# Patient Record
Sex: FEMALE | Race: AMERICAN INDIAN OR ALASKA NATIVE
[De-identification: names, ages, dates, MRNs, and addresses within clinical notes are randomized per-mention and may not be internally consistent; named-entity substitution may affect disease eponyms.]

---

## 2017-07-14 ENCOUNTER — HOSPITAL ENCOUNTER (EMERGENCY)
Dept: HOSPITAL 31 - C.ER | Age: 22
Discharge: HOME | End: 2017-07-14
Payer: COMMERCIAL

## 2017-07-14 VITALS
SYSTOLIC BLOOD PRESSURE: 116 MMHG | TEMPERATURE: 98 F | HEART RATE: 76 BPM | DIASTOLIC BLOOD PRESSURE: 77 MMHG | OXYGEN SATURATION: 100 % | RESPIRATION RATE: 17 BRPM

## 2017-07-14 VITALS — BODY MASS INDEX: 22.3 KG/M2

## 2017-07-14 DIAGNOSIS — R20.9: ICD-10-CM

## 2017-07-14 DIAGNOSIS — G89.29: Primary | ICD-10-CM

## 2017-07-14 DIAGNOSIS — M54.2: ICD-10-CM

## 2017-07-14 NOTE — C.PDOC
History Of Present Illness


23 Y/O FEMALE C/O INCREASED PAIN TO NECK RADIATING TO BILATERAL ARMS. PT ALSO 

REPORTS LEFT POSTERIOR LEG PAIN. NOTES HISTORY OF BULGING DISCS x MARCH 2017, 

TAKES IBUPROFEN AND MUSCLE RELAXANT DAILY. DENIES NEW INJURY OR TRAUMA. 


Time Seen by Provider: 07/14/17 07:38


Chief Complaint (Nursing): Upper Extremity Problem/Injury


History Per: Patient


History/Exam Limitations: no limitations


Onset/Duration Of Symptoms: Days


Current Symptoms Are (Timing): Still Present


Reports Recently: Treated By A Physician


Recent travel outside of the [place]: No





Past Medical History


Reviewed: Historical Data, Nursing Documentation, Vital Signs


Vital Signs: 


 Last Vital Signs











Temp  98.2 F   07/14/17 07:27


 


Pulse  95 H  07/14/17 07:27


 


Resp  18   07/14/17 07:27


 


BP  120/72   07/14/17 07:27


 


Pulse Ox  98   07/14/17 08:14














- Medical History


PMH: Asthma, Bronchitis





- [facility] Procedures








INJECT/INFUSE NEC (02/23/15)








Family History: States: Unknown Family Hx





- Social History


Hx Tobacco Use: No


Hx Alcohol Use: Yes


Hx Substance Use: No





- Immunization History


Hx Tetanus Toxoid Vaccination: No


Hx Influenza Vaccination: No


Hx Pneumococcal Vaccination: No





Review Of Systems


Except As Marked, All Systems Reviewed And Found Negative.


Constitutional: Negative for: Fever, Chills


Cardiovascular: Negative for: Chest Pain


Respiratory: Negative for: Cough


Gastrointestinal: Negative for: Nausea, Vomiting


Musculoskeletal: Positive for: Neck Pain, Arm Pain, Leg Pain


Skin: Negative for: Rash


Neurological: Negative for: Headache, Dizziness





Physical Exam





- Physical Exam


Appears: Non-toxic, No Acute Distress


Skin: Normal Color, Warm, Dry


Head: Atraumatic, Normacephalic


Neck: Normal ROM, No Midline Cervical Tenderness, No Step Off Deformity, Supple


Chest: Symmetrical


Cardiovascular: Rhythm Regular


Respiratory: Normal Breath Sounds, No Rales, No Rhonchi, No Wheezing


Gastrointestinal/Abdominal: Soft, No Tenderness, No Guarding, No Rebound


Back: Normal Inspection, No Vertebral Tenderness, No Paraspinal Tenderness


Extremity: Normal ROM, No Tenderness, Capillary Refill (< 2 SEC. ), No Deformity

, No Swelling


Extremity: Bilateral: Normal Color And Temperature


Neurological/Psych: Oriented x3, Normal Speech, Normal Cognition, Normal Motor, 

Normal Sensation





ED Course And Treatment


O2 Sat by Pulse Oximetry: 98 (RA)


Pulse Ox Interpretation: Normal





Disposition


Counseled Patient/Family Regarding: Diagnosis, Need For Followup, Rx Given





- Disposition


Referrals: 


YOUR,NEUROLOGIST [Other]


Disposition: HOME/ ROUTINE


Disposition Time: 08:11


Condition: IMPROVED


Prescriptions: 


Dexamethasone 12 mg PO ONCE #2 tab


oxyCODONE/Acetaminophen [Percocet 5/325 mg Tab] 1 ea PO QID #8 tab


Instructions:  Cervical Radiculopathy (ED)


Forms:  Work Excuse





- Clinical Impression


Clinical Impression: 


 Chronic radicular cervical pain, Paresthesia








- Scribe Statement


The provider has reviewed the documentation as recorded by the Hussain SKAGGS


Provider Attestation: 








All medical record entries made by the Hussain were at my direction and 

personally dictated by me. I have reviewed the chart and agree that the record 

accurately reflects my personal performance of the history, physical exam, 

medical decision making, and the department course for this patient. I have 

also personally directed, reviewed, and agree with the discharge instructions 

and disposition.

## 2017-08-05 ENCOUNTER — HOSPITAL ENCOUNTER (EMERGENCY)
Dept: HOSPITAL 31 - C.ER | Age: 22
LOS: 1 days | Discharge: HOME | End: 2017-08-06
Payer: COMMERCIAL

## 2017-08-05 VITALS — BODY MASS INDEX: 22.3 KG/M2

## 2017-08-05 DIAGNOSIS — K59.00: Primary | ICD-10-CM

## 2017-08-06 VITALS
SYSTOLIC BLOOD PRESSURE: 109 MMHG | HEART RATE: 66 BPM | RESPIRATION RATE: 16 BRPM | TEMPERATURE: 98.4 F | DIASTOLIC BLOOD PRESSURE: 71 MMHG

## 2017-08-06 VITALS — OXYGEN SATURATION: 99 %

## 2017-08-06 LAB
BILIRUB UR-MCNC: NEGATIVE MG/DL
GLUCOSE UR STRIP-MCNC: NORMAL MG/DL
HCG,QUALITATIVE URINE: NEGATIVE
LEUKOCYTE ESTERASE UR-ACNC: (no result) LEU/UL
PH UR STRIP: 5 [PH] (ref 5–8)
PROT UR STRIP-MCNC: NEGATIVE MG/DL
RBC # UR STRIP: (no result) /UL
SP GR UR STRIP: 1.02 (ref 1–1.03)
SQUAMOUS EPITHIAL: 11 /HPF (ref 0–5)
URINE NITRATE: NEGATIVE
UROBILINOGEN UR-MCNC: NORMAL MG/DL (ref 0.2–1)

## 2017-08-06 NOTE — RAD
Abdomen four views 



History: Obstructive series. 



Comparison: None available. 



Findings: 



Lung fields are clear. 



Bibasilar breast and nipple shadows.  Nipple rings in place. 



Relative paucity of small bowel gas. 



Moderate fecal retention in the visualized colon. 



Possible vaginal ring in place. Clinical correlation. 



Impression: 



Moderate fecal retention in the colon.

## 2017-08-06 NOTE — C.PDOC
History Of Present Illness


22 year old female presents to the ED with complaints of LLQ pain and urinary 

frequency for three days. Patient notes history of intermittent constipation 

and has had one bowel movement in the last week. She denies any nausea, vomiting

, diarrhea, fever, vaginal discharge, or dysuria. 


Time Seen by Provider: 08/06/17 00:21


Chief Complaint (Nursing): Abdominal Pain


History Per: Patient


History/Exam Limitations: no limitations


Onset/Duration Of Symptoms: Days (3 days )


Current Symptoms Are (Timing): Still Present


Location Of Pain/Discomfort: LLQ


Radiation Of Pain To:: None


Quality Of Discomfort: "Pain"


Associated Symptoms: Constipation, Other (urinary frequency ).  denies: Fever, 

Chills, Nausea, Vomiting, Diarrhea


Last Bowel Movement: Days Ago


Recent travel outside of the [place]: No


Abnormal Vaginal Bleeding: No





Past Medical History


Reviewed: Historical Data, Nursing Documentation, Vital Signs


Vital Signs: 


 Last Vital Signs











Temp  98.4 F   08/06/17 01:42


 


Pulse  66   08/06/17 01:42


 


Resp  16   08/06/17 01:42


 


BP  109/71   08/06/17 01:42


 


Pulse Ox  99   08/06/17 01:55














- Medical History


PMH: Asthma, Bronchitis





- [facility] Procedures








INJECT/INFUSE NEC (02/23/15)








Family History: States: Unknown Family Hx





- Social History


Hx Tobacco Use: No


Hx Alcohol Use: Yes


Hx Substance Use: No





- Immunization History


Hx Tetanus Toxoid Vaccination: No


Hx Influenza Vaccination: No


Hx Pneumococcal Vaccination: No





Review Of Systems


Constitutional: Negative for: Fever, Chills


Cardiovascular: Negative for: Chest Pain


Respiratory: Negative for: Shortness of Breath


Gastrointestinal: Positive for: Abdominal Pain, Constipation.  Negative for: 

Nausea, Vomiting, Diarrhea


Genitourinary: Positive for: Frequency





Physical Exam





- Physical Exam


Appears: Non-toxic, No Acute Distress


Skin: Warm, Dry


Head: Atraumatic


Eye(s): bilateral: Normal Inspection, PERRL, EOMI


Oral Mucosa: Moist


Neck: Supple


Chest: Symmetrical, No Deformity


Cardiovascular: Rhythm Regular


Respiratory: Normal Breath Sounds, No Wheezing


Gastrointestinal/Abdominal: Bowel Sounds (nl), Soft, No Tenderness, Distention (

slightly distended to bilateral lower quadrants ), No Guarding, No Rebound


Back: No CVA Tenderness


Pelvic: No Vaginal Discharge, No Cervical Motion Tenderness, No Adnexal 

Tenderness


Neurological/Psych: Oriented x3





ED Course And Treatment


O2 Sat by Pulse Oximetry: 99 (room air)


Pulse Ox Interpretation: Normal





- Other Rad


  ** Abd XR


X-Ray: Interpreted by Me, Viewed By Me


Interpretation: fecal impaction


Progress Note: UA and UcG reviewed and were negative. Abdominal X-Ray shows 

fecal impaction. Patient was given naproxen and enulose. Patient tolerated PO 

and resting comfortably.


Reassessment Condition: Improved





Disposition


Counseled Patient/Family Regarding: Diagnosis, Need For Followup, Rx Given





- Disposition


Disposition: HOME/ ROUTINE


Disposition Time: 01:07


Condition: STABLE


Additional Instructions: 


Please follow up with PMD 





High fiber diet





Take miralax as directed





Return to ER if worse 


Prescriptions: 


Polyethylene Glycol 3350 [Miralax] 17 gm PO DAILY #1 bottle


Instructions:  Constipation (ED), High Fiber Diet (ED)


Forms:  [facility] (English)





- Clinical Impression


Clinical Impression: 


 Constipation








- Scribe Statement


The provider has reviewed the documentation as recorded by the Scribe





Funmi Rowan





All medical record entries made by the Scribe were at my direction and 

personally dictated by me. I have reviewed the chart and agree that the record 

accurately reflects my personal performance of the history, physical exam, 

medical decision making, and the department course for this patient. I have 

also personally directed, reviewed, and agree with the discharge instructions 

and disposition.

## 2017-09-26 ENCOUNTER — HOSPITAL ENCOUNTER (EMERGENCY)
Dept: HOSPITAL 31 - C.ER | Age: 22
Discharge: HOME | End: 2017-09-26
Payer: COMMERCIAL

## 2017-09-26 VITALS
TEMPERATURE: 98.8 F | HEART RATE: 72 BPM | RESPIRATION RATE: 16 BRPM | DIASTOLIC BLOOD PRESSURE: 74 MMHG | SYSTOLIC BLOOD PRESSURE: 109 MMHG

## 2017-09-26 VITALS — BODY MASS INDEX: 22.3 KG/M2

## 2017-09-26 VITALS — OXYGEN SATURATION: 98 %

## 2017-09-26 DIAGNOSIS — N39.0: Primary | ICD-10-CM

## 2017-09-26 LAB
ALBUMIN/GLOB SERPL: 1.6 {RATIO} (ref 1–2.1)
ALP SERPL-CCNC: 60 U/L (ref 38–126)
ALT SERPL-CCNC: 25 U/L (ref 9–52)
AST SERPL-CCNC: 23 U/L (ref 14–36)
BACTERIA #/AREA URNS HPF: (no result) /[HPF]
BASOPHILS # BLD AUTO: 0 K/UL (ref 0–0.2)
BASOPHILS NFR BLD: 0.9 % (ref 0–2)
BILIRUB SERPL-MCNC: 0.8 MG/DL (ref 0.2–1.3)
BILIRUB UR-MCNC: NEGATIVE MG/DL
BUN SERPL-MCNC: 8 MG/DL (ref 7–17)
CALCIUM SERPL-MCNC: 9.2 MG/DL (ref 8.6–10.4)
CHLORIDE SERPL-SCNC: 102 MMOL/L (ref 98–107)
CO2 SERPL-SCNC: 23 MMOL/L (ref 22–30)
EOSINOPHIL # BLD AUTO: 0.1 K/UL (ref 0–0.7)
EOSINOPHIL NFR BLD: 1.7 % (ref 0–4)
ERYTHROCYTE [DISTWIDTH] IN BLOOD BY AUTOMATED COUNT: 12.9 % (ref 11.5–14.5)
GLOBULIN SER-MCNC: 2.6 GM/DL (ref 2.2–3.9)
GLUCOSE SERPL-MCNC: 70 MG/DL (ref 65–105)
GLUCOSE UR STRIP-MCNC: NORMAL MG/DL
HCT VFR BLD CALC: 37.8 % (ref 34–47)
KETONES UR STRIP-MCNC: NEGATIVE MG/DL
LEUKOCYTE ESTERASE UR-ACNC: (no result) LEU/UL
LYMPHOCYTES # BLD AUTO: 1.8 K/UL (ref 1–4.3)
LYMPHOCYTES NFR BLD AUTO: 33.1 % (ref 20–40)
MCH RBC QN AUTO: 29.6 PG (ref 27–31)
MCHC RBC AUTO-ENTMCNC: 33.6 G/DL (ref 33–37)
MCV RBC AUTO: 88.1 FL (ref 81–99)
MONOCYTES # BLD: 0.5 K/UL (ref 0–0.8)
MONOCYTES NFR BLD: 10 % (ref 0–10)
NRBC BLD AUTO-RTO: 0 % (ref 0–2)
PH UR STRIP: 5 [PH] (ref 5–8)
PLATELET # BLD: 265 K/UL (ref 130–400)
PMV BLD AUTO: 7.9 FL (ref 7.2–11.7)
POTASSIUM SERPL-SCNC: 3.8 MMOL/L (ref 3.6–5.2)
PROT SERPL-MCNC: 6.7 G/DL (ref 6.3–8.3)
PROT UR STRIP-MCNC: NEGATIVE MG/DL
RBC # UR STRIP: (no result) /UL
RBC #/AREA URNS HPF: 3 /HPF (ref 0–3)
SODIUM SERPL-SCNC: 139 MMOL/L (ref 132–148)
SP GR UR STRIP: 1.02 (ref 1–1.03)
UROBILINOGEN UR-MCNC: NORMAL MG/DL (ref 0.2–1)
WBC # BLD AUTO: 5.3 K/UL (ref 4.8–10.8)
WBC #/AREA URNS HPF: 8 /HPF (ref 0–5)

## 2017-09-26 PROCEDURE — 81001 URINALYSIS AUTO W/SCOPE: CPT

## 2017-09-26 PROCEDURE — 87086 URINE CULTURE/COLONY COUNT: CPT

## 2017-09-26 PROCEDURE — 96361 HYDRATE IV INFUSION ADD-ON: CPT

## 2017-09-26 PROCEDURE — 96374 THER/PROPH/DIAG INJ IV PUSH: CPT

## 2017-09-26 PROCEDURE — 87181 SC STD AGAR DILUTION PER AGT: CPT

## 2017-09-26 PROCEDURE — 99285 EMERGENCY DEPT VISIT HI MDM: CPT

## 2017-09-26 PROCEDURE — 85025 COMPLETE CBC W/AUTO DIFF WBC: CPT

## 2017-09-26 PROCEDURE — 80053 COMPREHEN METABOLIC PANEL: CPT

## 2017-09-26 PROCEDURE — 84703 CHORIONIC GONADOTROPIN ASSAY: CPT

## 2017-09-26 NOTE — C.PDOC
History Of Present Illness


22 year old female presents to the ED for evaluation of a headache which began 

around 2 weeks ago. Patient also reports nausea, generalized weakness, strong 

odorous urine, sharp back pain, and notes she had two episodes of vomiting 

during the past 2 weeks. Patient also reports increased urinary frequency and 

notes she is always thirsty. Patient has not taken any medicine for her 

symptoms. Patient reports a history of frequent UTIs in the past. She denies 

fever, chills, diarrhea, constipation, past surgical history. 


Time Seen by Provider: 09/26/17 13:52


Chief Complaint (Nursing): Headache


History Per: Patient


History/Exam Limitations: no limitations


Onset/Duration Of Symptoms: Days (2 weeks)


Current Symptoms Are (Timing): Still Present


Additional History Per: Patient





Past Medical History


Reviewed: Historical Data, Nursing Documentation, Vital Signs


Vital Signs: 


 Last Vital Signs











Temp  98.8 F   09/26/17 15:27


 


Pulse  72   09/26/17 15:27


 


Resp  16   09/26/17 15:27


 


BP  109/74   09/26/17 15:27


 


Pulse Ox  98   09/26/17 20:27














- Medical History


PMH: Asthma, Bronchitis


Surgical History: No Surg Hx





- [facility] Procedures








INJECT/INFUSE NEC (02/23/15)








Family History: States: Unknown Family Hx





- Social History


Hx Tobacco Use: No


Hx Alcohol Use: Yes


Hx Substance Use: No





- Immunization History


Hx Tetanus Toxoid Vaccination: No


Hx Influenza Vaccination: No


Hx Pneumococcal Vaccination: No





Review Of Systems


Constitutional: Positive for: Weakness (generalized ).  Negative for: Fever, 

Chills


Gastrointestinal: Positive for: Nausea, Vomiting.  Negative for: Diarrhea, 

Constipation


Genitourinary: Positive for: Frequency, Other (odorous urine )


Musculoskeletal: Positive for: Back Pain


Neurological: Positive for: Headache





Physical Exam





- Physical Exam


Appears: Non-toxic, No Acute Distress


Skin: Normal Color, Warm, Dry


Head: Atraumatic, Normacephalic


Eye(s): bilateral: Normal Inspection


Oral Mucosa: Moist


Neck: Supple


Chest: Symmetrical, No Deformity, No Tenderness


Cardiovascular: Rhythm Regular, No Murmur


Respiratory: Normal Breath Sounds, No Rales, No Rhonchi, No Wheezing


Gastrointestinal/Abdominal: Soft, No Tenderness, No Guarding, No Rebound


Back: Normal Inspection, No CVA Tenderness


Extremity: Normal ROM, Capillary Refill (less than 2 seconds )


Neurological/Psych: Oriented x3, Normal Speech


Gait: Steady





ED Course And Treatment





- Laboratory Results


Result Diagrams: 


 09/26/17 14:19





 09/26/17 14:19


O2 Sat by Pulse Oximetry: 98 (on RA)


Pulse Ox Interpretation: Normal





Medical Decision Making


Medical Decision Making: 





Plan: 


* labs 


* Zofran IV


* IV Fluids 


* reassess and disposition


Progress: 


labs ordered and reviewed. Patient has UTI. Upon re-eval patient has no fever 

and reports feeling better. Patient stable for discharge and will send home 

with Rx. Advise follow up with PCP





Disposition


Counseled Patient/Family Regarding: Diagnosis, Need For Followup, Rx Given





- Disposition


[facility]errals[facility] 


[facility]


Disposition: HOME/ ROUTINE


Disposition Time: 15:15


Condition: IMPROVED


Additional Instructions: 


Take antibiotic twice daily and be sure to finish taking all of antibiotic. 

Drink plenty of fluids. If urine culture was performed, call back for results 

in 2-3 days for results to confirm antibiotic is treating UTI well. 


Prescriptions: 


Ciprofloxacin [Cipro] 1 tab PO BID #10 tab


Instructions:  Urinary Tract Infection in Women (ED)


Forms:  [facility] (English)





- POA


Present On Arrival: None





- Clinical Impression


Clinical Impression: 


 UTI (urinary tract infection)








- PA / NP / Resident Statement


MD/DO has reviewed & agrees with the documentation as recorded.





- Scribe Statement


The provider has reviewed the documentation as recorded by the Scribe (July Hodges)








All medical record entries made by the Scribe were at my direction and 

personally dictated by me. I have reviewed the chart and agree that the record 

accurately reflects my personal performance of the history, physical exam, 

medical decision making, and the department course for this patient. I have 

also personally directed, reviewed, and agree with the discharge instructions 

and disposition.

## 2017-11-24 ENCOUNTER — HOSPITAL ENCOUNTER (EMERGENCY)
Dept: HOSPITAL 31 - C.ER | Age: 22
Discharge: LEFT BEFORE DISCHARGE | End: 2017-11-24
Payer: COMMERCIAL

## 2017-11-24 VITALS
SYSTOLIC BLOOD PRESSURE: 108 MMHG | HEART RATE: 84 BPM | OXYGEN SATURATION: 100 % | DIASTOLIC BLOOD PRESSURE: 69 MMHG | TEMPERATURE: 98.3 F | RESPIRATION RATE: 18 BRPM

## 2017-11-24 VITALS — BODY MASS INDEX: 21.6 KG/M2

## 2017-11-24 DIAGNOSIS — R10.30: Primary | ICD-10-CM

## 2017-11-24 DIAGNOSIS — Z02.9: ICD-10-CM

## 2017-11-24 LAB
BACTERIA #/AREA URNS HPF: (no result) /[HPF]
BILIRUB UR-MCNC: NEGATIVE MG/DL
GLUCOSE UR STRIP-MCNC: NORMAL MG/DL
KETONES UR STRIP-MCNC: (no result) MG/DL
LEUKOCYTE ESTERASE UR-ACNC: (no result) LEU/UL
PH UR STRIP: 6 [PH] (ref 5–8)
PROT UR STRIP-MCNC: NEGATIVE MG/DL
RBC # UR STRIP: NEGATIVE /UL
RBC #/AREA URNS HPF: 2 /HPF (ref 0–3)
SP GR UR STRIP: 1.02 (ref 1–1.03)
UROBILINOGEN UR-MCNC: NORMAL MG/DL (ref 0.2–1)
WBC #/AREA URNS HPF: 1 /HPF (ref 0–5)

## 2017-11-24 PROCEDURE — 81001 URINALYSIS AUTO W/SCOPE: CPT

## 2017-11-24 PROCEDURE — 84703 CHORIONIC GONADOTROPIN ASSAY: CPT

## 2017-11-25 ENCOUNTER — HOSPITAL ENCOUNTER (EMERGENCY)
Dept: HOSPITAL 31 - C.ER | Age: 22
Discharge: HOME | End: 2017-11-25
Payer: COMMERCIAL

## 2017-11-25 VITALS — OXYGEN SATURATION: 100 % | TEMPERATURE: 98.3 F | RESPIRATION RATE: 18 BRPM

## 2017-11-25 VITALS — SYSTOLIC BLOOD PRESSURE: 102 MMHG | DIASTOLIC BLOOD PRESSURE: 66 MMHG | HEART RATE: 81 BPM

## 2017-11-25 VITALS — BODY MASS INDEX: 21.6 KG/M2

## 2017-11-25 DIAGNOSIS — Z3A.00: ICD-10-CM

## 2017-11-25 DIAGNOSIS — O26.891: Primary | ICD-10-CM

## 2017-11-25 DIAGNOSIS — R10.2: ICD-10-CM

## 2017-11-25 LAB
ALBUMIN/GLOB SERPL: 1.9 {RATIO} (ref 1–2.1)
ALP SERPL-CCNC: 52 U/L (ref 38–126)
ALT SERPL-CCNC: 38 U/L (ref 9–52)
AST SERPL-CCNC: 24 U/L (ref 14–36)
BASOPHILS # BLD AUTO: 0 K/UL (ref 0–0.2)
BASOPHILS NFR BLD: 0.4 % (ref 0–2)
BILIRUB SERPL-MCNC: 1 MG/DL (ref 0.2–1.3)
BILIRUB UR-MCNC: NEGATIVE MG/DL
BUN SERPL-MCNC: 8 MG/DL (ref 7–17)
CALCIUM SERPL-MCNC: 8.7 MG/DL (ref 8.6–10.4)
CHLORIDE SERPL-SCNC: 101 MMOL/L (ref 98–107)
CO2 SERPL-SCNC: 22 MMOL/L (ref 22–30)
EOSINOPHIL # BLD AUTO: 0.1 K/UL (ref 0–0.7)
EOSINOPHIL NFR BLD: 1.2 % (ref 0–4)
ERYTHROCYTE [DISTWIDTH] IN BLOOD BY AUTOMATED COUNT: 13.4 % (ref 11.5–14.5)
GLOBULIN SER-MCNC: 2.2 GM/DL (ref 2.2–3.9)
GLUCOSE SERPL-MCNC: 86 MG/DL (ref 65–105)
GLUCOSE UR STRIP-MCNC: NORMAL MG/DL
HCT VFR BLD CALC: 38.7 % (ref 34–47)
KETONES UR STRIP-MCNC: (no result) MG/DL
LEUKOCYTE ESTERASE UR-ACNC: (no result) LEU/UL
LYMPHOCYTES # BLD AUTO: 1.6 K/UL (ref 1–4.3)
LYMPHOCYTES NFR BLD AUTO: 23 % (ref 20–40)
MCH RBC QN AUTO: 29.3 PG (ref 27–31)
MCHC RBC AUTO-ENTMCNC: 33.1 G/DL (ref 33–37)
MCV RBC AUTO: 88.3 FL (ref 81–99)
MONOCYTES # BLD: 0.6 K/UL (ref 0–0.8)
MONOCYTES NFR BLD: 8.3 % (ref 0–10)
NRBC BLD AUTO-RTO: 0 % (ref 0–2)
PH UR STRIP: 5 [PH] (ref 5–8)
PLATELET # BLD: 276 K/UL (ref 130–400)
PMV BLD AUTO: 8.2 FL (ref 7.2–11.7)
POTASSIUM SERPL-SCNC: 3.8 MMOL/L (ref 3.6–5.2)
PROT SERPL-MCNC: 6.4 G/DL (ref 6.3–8.3)
PROT UR STRIP-MCNC: NEGATIVE MG/DL
RBC # UR STRIP: NEGATIVE /UL
RBC #/AREA URNS HPF: < 1 /HPF (ref 0–3)
SODIUM SERPL-SCNC: 134 MMOL/L (ref 132–148)
SP GR UR STRIP: 1.02 (ref 1–1.03)
UROBILINOGEN UR-MCNC: NORMAL MG/DL (ref 0.2–1)
WBC # BLD AUTO: 6.9 K/UL (ref 4.8–10.8)
WBC #/AREA URNS HPF: 1 /HPF (ref 0–5)

## 2017-11-25 PROCEDURE — 76805 OB US >/= 14 WKS SNGL FETUS: CPT

## 2017-11-25 PROCEDURE — 96360 HYDRATION IV INFUSION INIT: CPT

## 2017-11-25 PROCEDURE — 87491 CHLMYD TRACH DNA AMP PROBE: CPT

## 2017-11-25 PROCEDURE — 99285 EMERGENCY DEPT VISIT HI MDM: CPT

## 2017-11-25 PROCEDURE — 84702 CHORIONIC GONADOTROPIN TEST: CPT

## 2017-11-25 PROCEDURE — 87591 N.GONORRHOEAE DNA AMP PROB: CPT

## 2017-11-25 PROCEDURE — 81001 URINALYSIS AUTO W/SCOPE: CPT

## 2017-11-25 PROCEDURE — 80053 COMPREHEN METABOLIC PANEL: CPT

## 2017-11-25 PROCEDURE — 76817 TRANSVAGINAL US OBSTETRIC: CPT

## 2017-11-25 PROCEDURE — 84703 CHORIONIC GONADOTROPIN ASSAY: CPT

## 2017-11-25 PROCEDURE — 85025 COMPLETE CBC W/AUTO DIFF WBC: CPT

## 2017-11-25 NOTE — US
PROCEDURE:  Pelvic/OB ultrasound. 



HISTORY:

Pelvic pain in a pregnant patient. 



COMPARISON:

Correlation/ comparison made with prior pelvic ultrasound 07/20/2016.



TECHNIQUE:

Transabdominal/transvaginal sonographic evaluation of the pelvis 

performed.



FINDINGS:

The uterus is anteverted measuring approximately 10.7 x 5.0 x 6.3 cm.



There is small fluid collection within the endometrial canal that 

probably represents gestational sac ; MS D = .88 cm = too small for 

dating. Tiny yolk sac measuring 2.1 mm present. No fetal pole 

-cardiac activity.  Findings most likely represent very early 

intrauterine gestation however followup serial serum beta HCG and 

serial ultrasound recommended to assess for development of viable 

intrauterine gestation and exclude ectopic pregnancy. 



Free fluid is present within the cul de sac. 



Right ovary measures approximately 5.4 x 3.6 x 5.7 cm.  There is a 

small cyst likely representing corpus luteum cyst of pregnancy that 

measures 3.1 x 3.1 x 3.9 cm. Arterial flow noted right ovary. 



Left ovary measures approximately 3.0 x 1.9 x 3.1 cm. Left ovary 

exhibits arterial flow. 



IMPRESSION:

Findings most likely represent very early intrauterine gestation 

however fetal pole and cardiac activity not documented at this time. 

Recommend of follow-up serial serum beta HCG and serial ultrasound to 

assess for development of viable intrauterine gestation and exclude 

ectopic pregnancy. 



There is free fluid seen in the cul de sac. 



Probable corpus luteum cyst of pregnancy right ovary.  Note this 

report was placed in PA review folder followup.

## 2017-11-25 NOTE — C.PDOC
History Of Present Illness





23 y/o female LMP 10/19, c/o pelvic pain for 2 weeks. Patient reports missing 

her period this month and took a home pregnancy test that was positive. Patient 

reports a miscarriage June of this year. Patient also c/o whitish vaginal 

discharge. Denies dysuria, hematuria, rash, or any other complaints.





Time Seen by Provider: 11/25/17 11:24


Chief Complaint (Nursing): Female Genitourinary


History Per: Patient


History/Exam Limitations: no limitations


Onset/Duration Of Symptoms: Days


Current Symptoms Are (Timing): Still Present


Severity: Mild


Quality Of Discomfort: "Pain"


Associated Symptoms: denies: Fever, Chills





Past Medical History


Reviewed: Historical Data, Nursing Documentation, Vital Signs


Vital Signs: 


 Last Vital Signs











Temp  98.3 F   11/25/17 11:13


 


Pulse  81   11/25/17 14:23


 


Resp  18   11/25/17 15:10


 


BP  102/66   11/25/17 14:23


 


Pulse Ox  100   11/25/17 15:26














- Medical History


PMH: Asthma, Bronchitis





- [facility] Procedures








INJECT/INFUSE NEC (02/23/15)








Family History: States: Unknown Family Hx





- Social History


Hx Tobacco Use: No


Hx Alcohol Use: Yes


Hx Substance Use: No





- Immunization History


Hx Tetanus Toxoid Vaccination: No


Hx Influenza Vaccination: No


Hx Pneumococcal Vaccination: No





Review Of Systems


Except As Marked, All Systems Reviewed And Found Negative.


Constitutional: Negative for: Fever, Chills


Genitourinary: Positive for: Pelvic Pain.  Negative for: Dysuria, Hematuria, 

Rash





Physical Exam





- Physical Exam


Appears: Non-toxic, No Acute Distress


Skin: Warm, Dry


Head: Atraumatic, Normacephalic


Oral Mucosa: Moist


Chest: Symmetrical


Cardiovascular: Rhythm Regular, No Murmur


Respiratory: Normal Breath Sounds, No Rales, No Rhonchi, No Wheezing


Gastrointestinal/Abdominal: Soft, Tenderness (Mild suprapubic tenderness)


Pelvic: No Vaginal Bleeding, Vaginal Discharge (Small amount of whitish, creamy 

discharge), No Cervical Motion Tenderness, No Cervix Open (closed), Enlarged 

Uterus, Tender Uterus (Mildly), No Other (cervicitis)


Neurological/Psych: Oriented x3





ED Course And Treatment





- Laboratory Results


Result Diagrams: 


 11/25/17 12:19





 11/25/17 12:19


O2 Sat by Pulse Oximetry: 100


Pulse Ox Interpretation: Normal





- CT Scan/US


  ** US Transvaginal


Other Rad Studies (CT/US): Interpreted By Me, Read By Radiologist


CT/US Interpretation: EXAM:  US Pregnancy, Transvaginal.  CLINICAL HISTORY:  22 

years old, female; Pain; Pregnancy complicated by abdominal or pelvic pain; 

Lower; First.  trimester; Gestational age or lmp: 5weeks; Pregnant; Additional 

info: Pregnant, pelvic pain.  TECHNIQUE:  Real-time transvaginal obstetrical 

ultrasound of the maternal pelvis and a first trimester pregnancy.  with image 

documentation. Transvaginal imaging was used for better evaluation of the fetus 

and.  adnexa.  COMPARISON:  No relevant prior studies available.  FINDINGS:  

Gestation: There is an intrauterine gestational sac measuring 1.0 x 0.6 x 1.0 

cm for mean sac.  diameter of 0.88 cm. A yolk sac is present.  Placenta/

amniotic fluid: Cannot be adequately evaluated due to the early gestational 

age.  Uterus/cervix: The uterus measures 10.8 x 4.8 by 6 cm. No myometrial 

mass.  Ovaries: The right ovary measures 5.7 x 5.4 x 3.6 cm and contains a 

complex cyst with a fluid/fluid.  level. The cyst measures 3.9 x 3.1 x 3.1 cm. 

Blood flow is seen in the right ovary on color Doppler.  examination. The left 

ovary measures 3 x 1.9 x 3.1 cm. Blood flow is seen in the left ovary on color.

  Doppler and pulse Doppler examination.  Free fluid: Free fluid is seen in the 

posterior cul-de-sac.  IMPRESSION:  1. Single intrauterine pregnancy. The 

gestational sac is too small for dating. No fetal pole. No cardiac.  activity. 

Followup might be considered to determine viability.  2.Complex cyst in the 

right ovary. No suspicious features.  __________________________________________

_____.  EXAM:  US Pregnancy First Trimester, Transabdominal.  EXAM DATE/TIME:  

Exam ordered 11/25/2017 12:07 PM.  CLINICAL HISTORY:  22 years old, female; Pain

; Pregnancy complicated by abdominal or pelvic pain; Lower; First.  trimester; 

Gestational age or lmp: 5weeks; Pregnant; Additional info: Pregnant, pelvic 

pain.  TECHNIQUE:  Real-time transabdominal obstetrical ultrasound of the 

maternal pelvis and a first trimester.  pregnancy with image documentation.  

COMPARISON:  No relevant prior studies available.  FINDINGS:  Gestation:  

Placenta/amniotic fluid: Cannot be adequately evaluated due to the early 

gestational age.  Uterus/cervix: The uterus measures 11 x 5.2 x 5.0 cm. A small 

fluid collection is noted within the.  uterus. No myometrial mass.  Ovaries: 

The right ovary measures 4.3 x 4.9 x 5.1 cm and contains a simple cyst 

measuring 4 cm in.  greatest diameter. Blood flow is seen in the right ovary on 

color Doppler examination. The left ovary.  is not seen as a separate structure 

transabdominally.  Free fluid: Ascites is noted in the posterior cul-de-sac.  

IMPRESSION:  Small fluid collection noted within the uterus. A yolk sac 

indicating pregnancy was noted within the.  fluid collection on the 

transvaginal portion of the study. See above.


Progress Note: Blood work, Pelvic US, IV fluids, UA. On re-evaluation patient 

stable tolerating po, no acute pain. Patient was d/c home, instructed to repeat 

pelvic US in 1 week.





Disposition





- Disposition


Disposition: HOME/ ROUTINE


Disposition Time: 15:05


Condition: STABLE


Additional Instructions: 


Follow up with PMD and OBGYN within 1-2 days. return to Ed if feel worse. 

Pelvic US should be repeated in 1 week.


Prescriptions: 


Prenatal Vit Calc,Iron,Folic [Prenatal Vitamins] 1 each PO DAILY #30 tablet


Instructions:  Abdominal Pain in Pregnancy  (ED), First Trimester Pregnancy (ED)


Forms:  [facility] (English)





- Clinical Impression


Clinical Impression: 


 Pelvic pain during pregnancy








- Scribe Statement


The provider has reviewed the documentation as recorded by the Scribparish mccord





All medical record entries made by the Joanibe were at my direction and 

personally dictated by me. I have reviewed the chart and agree that the record 

accurately reflects my personal performance of the history, physical exam, 

medical decision making, and the department course for this patient. I have 

also personally directed, reviewed, and agree with the discharge instructions 

and disposition.

## 2018-04-24 ENCOUNTER — HOSPITAL ENCOUNTER (EMERGENCY)
Dept: HOSPITAL 31 - C.ER | Age: 23
Discharge: HOME | End: 2018-04-24
Payer: COMMERCIAL

## 2018-04-24 VITALS — BODY MASS INDEX: 21.6 KG/M2

## 2018-04-24 VITALS — OXYGEN SATURATION: 100 % | TEMPERATURE: 97.9 F

## 2018-04-24 VITALS — RESPIRATION RATE: 20 BRPM | HEART RATE: 78 BPM | DIASTOLIC BLOOD PRESSURE: 74 MMHG | SYSTOLIC BLOOD PRESSURE: 122 MMHG

## 2018-04-24 DIAGNOSIS — R07.89: Primary | ICD-10-CM

## 2018-04-24 LAB
ALBUMIN SERPL-MCNC: 3.4 G/DL (ref 3.5–5)
ALBUMIN/GLOB SERPL: 1.1 {RATIO} (ref 1–2.1)
ALT SERPL-CCNC: 39 U/L (ref 9–52)
AST SERPL-CCNC: 30 U/L (ref 14–36)
BACTERIA #/AREA URNS HPF: (no result) /[HPF]
BASOPHILS # BLD AUTO: 0.1 K/UL (ref 0–0.2)
BASOPHILS NFR BLD: 0.6 % (ref 0–2)
BILIRUB UR-MCNC: NEGATIVE MG/DL
BUN SERPL-MCNC: 7 MG/DL (ref 7–17)
CALCIUM SERPL-MCNC: 8.5 MG/DL (ref 8.6–10.4)
EOSINOPHIL # BLD AUTO: 0.1 K/UL (ref 0–0.7)
EOSINOPHIL NFR BLD: 1 % (ref 0–4)
ERYTHROCYTE [DISTWIDTH] IN BLOOD BY AUTOMATED COUNT: 12.9 % (ref 11.5–14.5)
GFR NON-AFRICAN AMERICAN: > 60
GLUCOSE UR STRIP-MCNC: NORMAL MG/DL
HGB BLD-MCNC: 11.3 G/DL (ref 11–16)
LEUKOCYTE ESTERASE UR-ACNC: (no result) LEU/UL
LYMPHOCYTES # BLD AUTO: 1.6 K/UL (ref 1–4.3)
LYMPHOCYTES NFR BLD AUTO: 16.3 % (ref 20–40)
MCH RBC QN AUTO: 30.3 PG (ref 27–31)
MCHC RBC AUTO-ENTMCNC: 34.3 G/DL (ref 33–37)
MCV RBC AUTO: 88.5 FL (ref 81–99)
MONOCYTES # BLD: 0.7 K/UL (ref 0–0.8)
MONOCYTES NFR BLD: 7.1 % (ref 0–10)
NEUTROPHILS # BLD: 7.4 K/UL (ref 1.8–7)
NEUTROPHILS NFR BLD AUTO: 75 % (ref 50–75)
NRBC BLD AUTO-RTO: 0 % (ref 0–2)
PH UR STRIP: 6 [PH] (ref 5–8)
PLATELET # BLD: 212 K/UL (ref 130–400)
PMV BLD AUTO: 8.6 FL (ref 7.2–11.7)
PROT UR STRIP-MCNC: NEGATIVE MG/DL
RBC # BLD AUTO: 3.73 MIL/UL (ref 3.8–5.2)
RBC # UR STRIP: NEGATIVE /UL
SP GR UR STRIP: 1.02 (ref 1–1.03)
SQUAMOUS EPITHIAL: 4 /HPF (ref 0–5)
UROBILINOGEN UR-MCNC: 2 MG/DL (ref 0.2–1)
WBC # BLD AUTO: 9.9 K/UL (ref 4.8–10.8)

## 2018-04-24 NOTE — C.PDOC
History Of Present Illness


22 y/o female presents to the ER complaining of digitally and positionally 

reproducible mid-sternal chest pain. Patient reports that the pain was 

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 

and she lifts heavy objects. She denies having falls, trauma, and other 

complaints.


Time Seen by Provider: 18 13:11


Chief Complaint (Nursing): Chest Pain


History Per: Patient


History/Exam Limitations: no limitations


Onset/Duration Of Symptoms: Days


Current Symptoms Are (Timing): Still Present


Severity: Moderate





Past Medical History


Reviewed: Historical Data, Nursing Documentation, Vital Signs


Vital Signs: 


 Last Vital Signs











Temp  97.9 F   18 12:34


 


Pulse  78   18 15:32


 


Resp  20   18 15:32


 


BP  122/74   18 15:32


 


Pulse Ox  100   18 14:39














- Medical History


PMH: Asthma, Bronchitis


Surgical History: No Surg Hx





- [facility] Procedures








INJECT/INFUSE NEC (02/23/15)








Family History: States: No Known Family Hx





- Social History


Hx Tobacco Use: No


Hx Alcohol Use: Yes


Hx Substance Use: No





- Immunization History


Hx Tetanus Toxoid Vaccination: No


Hx Influenza Vaccination: No


Hx Pneumococcal Vaccination: No





Review Of Systems


Except As Marked, All Systems Reviewed And Found Negative.


Constitutional: Negative for: Fever, Chills


Cardiovascular: Positive for: Chest Pain


Respiratory: Negative for: Cough, Shortness of Breath





Physical Exam





- Physical Exam


Appears: Non-toxic, No Acute Distress


Skin: Normal Color, Warm, Rash (no rash noted to bilateral parastenal area)


Head: Atraumatic, Normacephalic


Eye(s): bilateral: Normal Inspection


Nose: Normal


Oral Mucosa: Moist


Neck: Supple


Chest: Symmetrical, Tenderness (tenderness to bilateral parasternal areas T2, T3

)


Cardiovascular: Rhythm Regular


Respiratory: Normal Breath Sounds, No Rales, No Rhonchi, No Wheezing


Neurological/Psych: Oriented x3, Normal Speech





ED Course And Treatment





- Laboratory Results


Result Diagrams: 


 18 14:00





 18 14:00


Lab Interpretation: Normal (ua/trop neg.)


ECG: Interpreted By Me


ECG Rhythm: Sinus Rhythm


ECG Interpretation: Normal


Rate From EC


O2 Sat by Pulse Oximetry: 100 (RA)


Pulse Ox Interpretation: Normal


Progress Note: tylenol, ice pack to sternum


Reevaluation Time: 14:38


Reassessment Condition: Improved





Medical Decision Making


Medical Decision Making: 





costochondritis b/l sternal area T2/3


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





normal labs


normal pregnancy, no belly issues.


NO susp of DVT/PE


no cardiac strain





Disposition


Doctor Will See Patient In The: Office


Counseled Patient/Family Regarding: Studies Performed, Diagnosis





- Disposition


Referrals: 


Chandler Benites DO [Staff Provider] - 


Disposition: HOME/ ROUTINE


Disposition Time: 14:39


Condition: GOOD


Additional Instructions: 


ice packs 1/2 hour per hour, nothing hot, no hot showers


Tylenol 1000 mg every 6 hours as needed for pain- safe in pregnancy


Follow-up w Dr. Benites as needed. 


Instructions:  Costochondritis


Forms:  [facility] (English)





- Clinical Impression


Clinical Impression: 


 Chest wall discomfort








- Scribe Statement


The provider has reviewed the documentation as recorded by the Joanibe


Bubba Huffman


Provider Attestation: 





All medical record entries made by the Scribe were at my direction and 

personally dictated by me. I have reviewed the chart and agree that the record 

accurately reflects my personal performance of the history, physical exam, 

medical decision making, and the department course for this patient. I have 

also personally directed, reviewed, and agree with the discharge instructions 

and disposition.

## 2018-04-25 NOTE — CARD
--------------- APPROVED REPORT --------------





EKG Measurement

Heart [id number]

[id number]

[id number]

[id number]

[id number]



<Conclusion>

Normal sinus rhythm

Septal infarct, age undetermined

Abnormal ECG

## 2018-10-05 ENCOUNTER — HOSPITAL ENCOUNTER (OUTPATIENT)
Dept: HOSPITAL 31 - C.SDS | Age: 23
Discharge: HOME | End: 2018-10-05
Attending: OTOLARYNGOLOGY
Payer: COMMERCIAL

## 2018-10-05 VITALS
TEMPERATURE: 97.3 F | DIASTOLIC BLOOD PRESSURE: 63 MMHG | OXYGEN SATURATION: 100 % | RESPIRATION RATE: 18 BRPM | HEART RATE: 78 BPM | SYSTOLIC BLOOD PRESSURE: 111 MMHG

## 2018-10-05 VITALS — BODY MASS INDEX: 21.6 KG/M2

## 2018-10-05 DIAGNOSIS — J35.01: Primary | ICD-10-CM

## 2018-10-05 PROCEDURE — 88304 TISSUE EXAM BY PATHOLOGIST: CPT

## 2018-10-05 PROCEDURE — 42826 REMOVAL OF TONSILS: CPT

## 2018-10-05 NOTE — OP
PROCEDURE DATE:  10/05/2018



PREOPERATIVE DIAGNOSIS:  Chronic tonsillitis.



POSTOPERATIVE DIAGNOSIS:  Chronic tonsillitis.



PROCEDURE:  Tonsillectomy.



SIGNIFICANT FINDINGS:  Chronically infected tonsils.



PROCEDURE:  The patient was brought into room, placed in supine position,

anesthesia was initiated through an ET tube.  The patient was draped in

usual manner.  Mouth gag was placed in the oral cavity, opened and

suspended in the [place]  the usual manner.  Right tonsil was grabbed,

pulled medially.  Incision was made in the anterior tonsillar pillar using

coblation, dissection was done between tonsil and tonsillar fossa using

coblation until the tonsil was removed.  Bleeding was controlled using

coblation.  Next, the other tonsil was grabbed, pulled medially.  Incision

was made in the anterior tonsillar pillar using coblation, dissection was

done between tonsil and tonsillar fossa using coblation until the tonsil

was removed.  Bleeding was controlled using coblation.  Both tonsillar beds

were rubbed vigorously with coblation wand.  No bleeding was noted.  Mouth

gag was let down for 30 seconds, put back up, no bleeding was noted.  Mouth

gag was taken out and removed.  The patient was taken off anesthesia and

taken to recovery room in stable manner.







__________________________________________

Babak Behin, MD





DD:  10/05/2018 9:35:46

DT:  10/05/2018 9:40:15

Job # [id number]